# Patient Record
Sex: FEMALE | Race: WHITE | NOT HISPANIC OR LATINO | URBAN - METROPOLITAN AREA
[De-identification: names, ages, dates, MRNs, and addresses within clinical notes are randomized per-mention and may not be internally consistent; named-entity substitution may affect disease eponyms.]

---

## 2021-02-15 ENCOUNTER — OFFICE (OUTPATIENT)
Dept: URBAN - METROPOLITAN AREA CLINIC 119 | Facility: CLINIC | Age: 36
End: 2021-02-15

## 2021-02-15 DIAGNOSIS — K58.9 IRRITABLE BOWEL SYNDROME (IBS): ICD-10-CM

## 2021-02-15 DIAGNOSIS — R19.7 DIARRHEA (UNSPECIFIED): ICD-10-CM

## 2021-02-15 DIAGNOSIS — R19.4 CHANGE IN BOWEL HABITS: ICD-10-CM

## 2021-02-15 PROCEDURE — 99203 OFFICE O/P NEW LOW 30 MIN: CPT | Performed by: INTERNAL MEDICINE

## 2021-02-15 PROCEDURE — G0406 INPT/TELE FOLLOW UP 15: HCPCS | Performed by: INTERNAL MEDICINE

## 2021-02-15 NOTE — SERVICEHPINOTES
Ms. Santoro is a pleasant 35 year old female who presents for the evaluation of urgent diarrhea.  She was referred by GI fellow, Marina.  She saw Dr. Tobar four years ago for similar symptoms.  She has   BRdiarrhea with urgency.  She has 1-2 bowel movements per day 60% loose, typically soft with no blood or mucus.  She had one episode of bloody stool six years ago and went to the ER while living in [place] in 2015 and she was thought to have IBS.  Her symptoms are worse in high anxiety situations and are triggered by alcohol, coffee, chocolate, black tea, spicy food.   She has persistent dull subtle right sided discomfort.  She rarely uses Imodium which can be helpful for situational use and during travel.  She has had two accidents when she could not make it to the restroom over the last several years.  Her symptoms have been chronic since the age of 26.  She studied abroad in [facility] in 2008 but did not experience an acute illness while there.  She denies fevers, chills, weight loss, family history of intestinal issues.  Last week she did stool and blood tests at [facility].JODY

## 2024-09-05 ENCOUNTER — TELEPHONE (OUTPATIENT)
Dept: OTHER | Age: 39
End: 2024-09-05